# Patient Record
Sex: FEMALE
[De-identification: names, ages, dates, MRNs, and addresses within clinical notes are randomized per-mention and may not be internally consistent; named-entity substitution may affect disease eponyms.]

---

## 2020-04-29 PROBLEM — Z00.00 ENCOUNTER FOR PREVENTIVE HEALTH EXAMINATION: Status: ACTIVE | Noted: 2020-04-29

## 2020-04-30 ENCOUNTER — APPOINTMENT (OUTPATIENT)
Dept: OTOLARYNGOLOGY | Facility: CLINIC | Age: 35
End: 2020-04-30
Payer: COMMERCIAL

## 2020-04-30 DIAGNOSIS — Z78.9 OTHER SPECIFIED HEALTH STATUS: ICD-10-CM

## 2020-04-30 DIAGNOSIS — Z87.19 PERSONAL HISTORY OF OTHER DISEASES OF THE DIGESTIVE SYSTEM: ICD-10-CM

## 2020-04-30 DIAGNOSIS — Z82.5 FAMILY HISTORY OF ASTHMA AND OTHER CHRONIC LOWER RESPIRATORY DISEASES: ICD-10-CM

## 2020-04-30 DIAGNOSIS — Z86.59 PERSONAL HISTORY OF OTHER MENTAL AND BEHAVIORAL DISORDERS: ICD-10-CM

## 2020-04-30 PROCEDURE — 99203 OFFICE O/P NEW LOW 30 MIN: CPT | Mod: 95

## 2020-04-30 RX ORDER — AMOXICILLIN AND CLAVULANATE POTASSIUM 875; 125 MG/1; MG/1
875-125 TABLET, COATED ORAL
Qty: 20 | Refills: 0 | Status: ACTIVE | COMMUNITY
Start: 2020-04-30 | End: 1900-01-01

## 2020-04-30 NOTE — CONSULT LETTER
[Dear  ___] : Dear  [unfilled], [Sincerely,] : Sincerely, [Consult Closing:] : Thank you very much for allowing me to participate in the care of this patient.  If you have any questions, please do not hesitate to contact me. [Please see my note below.] : Please see my note below. [FreeTextEntry1] : I had the pleasure of evaluating your patient, Peggy Galloway via telemedicine.  [FreeTextEntry3] : Rose Yates MD\par

## 2020-04-30 NOTE — HISTORY OF PRESENT ILLNESS
[Other Location: e.g. School (Enter Location, City,State)___] : at [unfilled], at the time of the visit. [Medical Office: ([facility])___] : at the medical office located in  [Patient] : the patient [de-identified] : Visit initiated at patient request.  This audio/visual (using [employer]) visit is occurring during the state of emergency due to COVID-19.  Government regulation is restricting travel, in-person contact, recommend use of remote activities and telemedicine whenever possible.  I discussed with patient the limitations of telemedicine encounters, including the risks associated with the technology platform, technical difficulties, data security, and a limited physical exam.  There is also a limitation in performing diagnostic procedures and patient may need further testing and work up to arrive at a diagnosis and treatment plan.  .  EDUAR HAMILTON understood and elected to proceed at : 9 am on:  4/30/20\par \par EDUAR HAMILTON is a 34 year patient With a one to two-week history of ear discomfort. She has occasional ear infections. For the past few months, she has scaling of the skin and crusting in the meatus of the left ear. She went to urgent care a few months ago. They said her ears were okay but they did give her ear drops. They may have helped a little bit. She occasionally has eczema-like changes.  Sccasionally uses Q-tips and scratches her ear. Her left ear was bothering her initially. She used Debrox drops followed by irrigation system for wax removal in both ears. The left ear felt better. She has now had right-sided tinnitus and sensation of eustachian tube dysfunction. She has mild right ear discomfort. The tinnitus is intermittent. She has no otorrhea or dizziness. She also has occasional mild pressure/headache on the right side of her head. She may have seasonal allergies. She had a runny nose last week but no nasal obstruction. She denies fevers, cough, shortness of breath, or loss of smell or taste. She has been self isolating in [place] and has not left the house. She did fall about 2-3 weeks ago. She did not hit her head or have loss of conciousness but thinks she may have had a concussion-like symptoms for a few days. It completely resolveed. She may be grinding her teeth. She had occasional ear infections as a child but no history of prior otologic surgery, ear trauma, noise exposure, or known hearing loss. She tried a nasal decongestant spray. She did not try other medications.

## 2020-04-30 NOTE — ASSESSMENT
[FreeTextEntry1] : She has a 1-2 week history of right ear symptoms. She has mild discomfort, fullness, and tinnitus. She also has a runny nose which may be due to seasonal allergies. We discussed the possibility of COVID infection. She has no known exposure and no other significant symptoms associated with it. She may have seasonal allergies. We discussed the possible etiologies for her symptoms such as otitis externa (bacterial or fungal), otitis media, eustachian tube dysfunction, sudden SNHL and possible TMJ dysfunction.\par \par Plan\par -Findings and management options were discussed with the patient. 30 minutes was spent with her during the visit. More than 50% of the time was spent discussing the symptoms and management/plan options.\par --Good aural hygiene reviewed. The patient was told to avoid cleaning the ears with cotton swabs.\par -dry ear precautions\par -I am giving her ciprodex eardrops to use for the next several days for possible OE. she did recently irrigate her ears. although she may have had the symptoms prior to that. If she has worsening symptoms, we may consider treating her for fungal otitis externa.\par -Decongestant and allergy medications as needed. I discussed concerns using nasal sprays during the Covid outbreak causing worsening or increased susceptibility to the infection. \par -We discussed the possibility of acute otitis media. Without being able to examine her ear, it is difficult to say for certain. I did give her Augmentin to take if needed\par -It would be hellpful to have an audiogram. She may try doing an audiogram at home using one of the apps on a smart phone. Although they may not be as accurate as an audiogram in the office, it will give us some information which could indicate if she has a hearing loss in the ear. I did discuss the possibility of a sudden sensorineural hearing loss. I discussed the treatment options for that. However, she wlll need an ear exam and audiogram to diagnose it.\par -She can see an ENT physician in [place] where is is to ensure there was no sudden sensorineural hearing loss and to examine her ear. The exam would definitively tell us if she has otitis media or otitis externa. She is not sure that she wants to do that. I'm happy to see her in the office here in [place] if needed[place]\par -We also discussed the possibility of TMJ dysfunction contributing to ear discomfort and the headaches. I will send her information about TMJD. I did review management of it as well. .\par -I asked her call me on Monday to let me know how she is doing. We'll discuss further management. I also told her she has any concerns to call me prior to that. If she does do the at home audiogram, I asked her to let me know so we can have her send it to me. \par

## 2020-05-18 ENCOUNTER — APPOINTMENT (OUTPATIENT)
Dept: OTOLARYNGOLOGY | Facility: CLINIC | Age: 35
End: 2020-05-18
Payer: COMMERCIAL

## 2020-05-18 DIAGNOSIS — H60.8X2 OTHER OTITIS EXTERNA, LEFT EAR: ICD-10-CM

## 2020-05-18 DIAGNOSIS — H92.09 OTALGIA, UNSPECIFIED EAR: ICD-10-CM

## 2020-05-18 DIAGNOSIS — H93.11 TINNITUS, RIGHT EAR: ICD-10-CM

## 2020-05-18 PROCEDURE — 99213 OFFICE O/P EST LOW 20 MIN: CPT | Mod: 95

## 2020-05-18 RX ORDER — CIPROFLOXACIN AND DEXAMETHASONE 3; 1 MG/ML; MG/ML
0.3-0.1 SUSPENSION/ DROPS AURICULAR (OTIC) TWICE DAILY
Qty: 1 | Refills: 1 | Status: COMPLETED | COMMUNITY
Start: 2020-04-30 | End: 2020-05-18

## 2020-05-18 RX ORDER — COLISTIN SULFATE, NEOMYCIN SULFATE, THONZONIUM BROMIDE AND HYDROCORTISONE ACETATE 3; 3.3; .5; 1 MG/ML; MG/ML; MG/ML; MG/ML
3.3-3-10-0.5 SUSPENSION AURICULAR (OTIC)
Qty: 1 | Refills: 0 | Status: ACTIVE | COMMUNITY
Start: 2020-05-18 | End: 1900-01-01

## 2020-05-18 NOTE — ASSESSMENT
[FreeTextEntry1] : She continues to have bilateral intermittent ear discomfort and fullness. She has itching and dry skin in the left ear. She also has intermittent right-sided tinnitus. We then discussed in detail possible causes of her symptoms. Because she has bilateral ear discomfort, I'm wondering if she could have a cause like TMJ dysfunction. She does not think so. We also discussed the possibility of eustachian tube dysfunction. That is a possibility. I think acute otitis media is less likely given her description of her symptoms.  I also discussed the possibility of otitis externa. she does think that the eardrops may have helped somewhat. She did go to urgent care before she became isolated during the COVID outbreak and her exam was reportedly unremarkable. I also discussed the possibility of a sudden right SNHL causing the tinnitus. \par \par Plan\par -Findings and management options were discussed with the patient. 23 minutes was spent with her during the visit. More than 50% of the time was spent discussing the symptoms and management/plan options.\par -continue good aural hygiene and dry ear precautions.  avoid cleaning the ears with cotton swabs.\par -she may try another antibiotic eardrop for 7 days. Consider dermotic solution for chronic eczematous otitis externa.  I would recommend an exam first.  \par -Decongestant or antihistamine as needed\par -continue flonase. A short burst of prednisone may be helpful if she has ETD. However, I do not feel comfortable prescribing this to her without performing an exam\par -She was wondering if she should try the oral antibiotics. She could if she wishes although her symptoms do not seem to be consistent with acute otitis media. Without a physical exam, I cannot rule it out completely and there are risks to taking antibiotics. \par -we again discussed having her try an at-home hearing test on an oscar. it may give us some more information if she is not able to have a complete audiogram. if she does one, I asked her to contact me so I can review it. \par -At this point, I have recommended that she be examined. It is difficult to know exactly what her diagnosis is without a complete ear exam and audiogram. She may also need flexible laryngoscopy and nasal endoscopy to evaluate the sinuses. If she does not feel comfortable coming into our office in [place] (although we are taking all precautions and using PPE during the COVID outbreak), she may consider seeing a physician or going to an urgent care in [place]. I would like to ensure she does not have a sudden sensorineural hearing loss which needs to be treated in a timely fashion and that she does not have a more serious condition. \par -I asked her to call me in about one week with an update on her condition. she should call me earlier if any questions or concerns.

## 2020-05-18 NOTE — HISTORY OF PRESENT ILLNESS
[Other Location: e.g. School (Enter Location, City,State)___] : at [unfilled], at the time of the visit. [Medical Office: ([facility])___] : at the medical office located in  [Patient] : the patient [de-identified] : Visit initiated at patient request.  This audio/visual (using [email or web address]) visit is occurring during the state of emergency due to COVID-19.  Government regulation is restricting travel, in-person contact, recommend use of remote activities and telemedicine whenever possible.The patient is aware of the limitations of telemedicine encounters including the risks associated with the technology platform  and a limited physical exam as she has had telemedicine encounter before.  There is also a limitation in performing diagnostic procedures and patient may need further testing and work up to arrive at a diagnosis and treatment plan.   EDUAR HAMILTON understood and elected to proceed at  3pm on 5/18/20. \par \par EDUAR HAMILTON is being seen in followup for bilateral ear pressure and discomfort. She had a telemedicine visit on April 30. She continues to have a dull pain and ear pressure in the ears bilaterally. It is intermittent. She also has intermittent right-sided tinnitus. She has dry skin in the left ear. It does itch. She also has a sensation of popping in the ears and possible eustachian tube dysfunction.  Over the past weekend, she had a little bit of right frontal sinus pressure. She tried the antibiotic ear drops. The drops were switched to Floxin due to insurance issues. She said that she had difficulty getting them in the ear. She used them for about 4 days. They may have helped. She ended up not taking the antibiotics. She started Flonase about 4-5 days ago. She bought a nightguard but did not try it.  She also did not try one of the audiogram apps at home.\par

## 2021-08-14 ENCOUNTER — OFFICE VISIT (OUTPATIENT)
Dept: URBAN - METROPOLITAN AREA TELEHEALTH 2 | Facility: TELEHEALTH | Age: 36
End: 2021-08-14